# Patient Record
Sex: FEMALE | Race: WHITE | NOT HISPANIC OR LATINO | Employment: OTHER | URBAN - METROPOLITAN AREA
[De-identification: names, ages, dates, MRNs, and addresses within clinical notes are randomized per-mention and may not be internally consistent; named-entity substitution may affect disease eponyms.]

---

## 2021-01-01 ENCOUNTER — OFFICE VISIT (OUTPATIENT)
Dept: RADIATION ONCOLOGY | Facility: HOSPITAL | Age: 86
End: 2021-01-01

## 2021-01-01 ENCOUNTER — HOSPITAL ENCOUNTER (INPATIENT)
Facility: HOSPITAL | Age: 86
LOS: 1 days | End: 2021-04-26
Attending: INTERNAL MEDICINE | Admitting: INTERNAL MEDICINE

## 2021-01-01 ENCOUNTER — HOSPITAL ENCOUNTER (OUTPATIENT)
Dept: RADIATION ONCOLOGY | Facility: HOSPITAL | Age: 86
Setting detail: RADIATION/ONCOLOGY SERIES
Discharge: HOME | End: 2021-04-21

## 2021-01-01 ENCOUNTER — HOSPITAL ENCOUNTER (INPATIENT)
Facility: HOSPITAL | Age: 86
LOS: 1 days | Discharge: HOSPICE | End: 2021-04-25
Attending: RADIOLOGY | Admitting: INTERNAL MEDICINE

## 2021-01-01 VITALS
DIASTOLIC BLOOD PRESSURE: 51 MMHG | SYSTOLIC BLOOD PRESSURE: 90 MMHG | RESPIRATION RATE: 20 BRPM | HEART RATE: 135 BPM | OXYGEN SATURATION: 87 % | TEMPERATURE: 101.9 F

## 2021-01-01 VITALS
BODY MASS INDEX: 29.62 KG/M2 | WEIGHT: 156.75 LBS | TEMPERATURE: 98.2 F | OXYGEN SATURATION: 92 % | SYSTOLIC BLOOD PRESSURE: 111 MMHG | DIASTOLIC BLOOD PRESSURE: 71 MMHG | RESPIRATION RATE: 22 BRPM | HEART RATE: 121 BPM

## 2021-01-01 VITALS
SYSTOLIC BLOOD PRESSURE: 141 MMHG | OXYGEN SATURATION: 96 % | RESPIRATION RATE: 16 BRPM | TEMPERATURE: 97.8 F | HEART RATE: 77 BPM | DIASTOLIC BLOOD PRESSURE: 80 MMHG | HEIGHT: 61 IN | BODY MASS INDEX: 28.89 KG/M2 | WEIGHT: 153 LBS

## 2021-01-01 DIAGNOSIS — C34.11 MALIGNANT NEOPLASM OF UPPER LOBE OF RIGHT LUNG (HCC): ICD-10-CM

## 2021-01-01 DIAGNOSIS — C79.31 BRAIN METASTASES: Primary | ICD-10-CM

## 2021-01-01 DIAGNOSIS — C32.9 LARYNGEAL CANCER (HCC): Primary | ICD-10-CM

## 2021-01-01 DIAGNOSIS — C50.412 MALIGNANT NEOPLASM OF UPPER-OUTER QUADRANT OF LEFT BREAST IN FEMALE, ESTROGEN RECEPTOR POSITIVE (HCC): ICD-10-CM

## 2021-01-01 DIAGNOSIS — I48.20 CHRONIC ATRIAL FIBRILLATION (HCC): ICD-10-CM

## 2021-01-01 DIAGNOSIS — Z17.0 MALIGNANT NEOPLASM OF UPPER-OUTER QUADRANT OF LEFT BREAST IN FEMALE, ESTROGEN RECEPTOR POSITIVE (HCC): ICD-10-CM

## 2021-01-01 LAB
CHOLEST SERPL-MCNC: 93 MG/DL (ref 0–200)
HBA1C MFR BLD: 4.7 % (ref 4.8–5.6)
HDLC SERPL-MCNC: 34 MG/DL (ref 40–60)
LDLC SERPL CALC-MCNC: 46 MG/DL (ref 0–100)
LDLC/HDLC SERPL: 1.42 {RATIO}
SARS-COV-2 RDRP RESP QL NAA+PROBE: DETECTED
TRIGL SERPL-MCNC: 54 MG/DL (ref 0–150)
VLDLC SERPL-MCNC: 13 MG/DL (ref 5–40)

## 2021-01-01 PROCEDURE — C1894 INTRO/SHEATH, NON-LASER: HCPCS | Performed by: RADIOLOGY

## 2021-01-01 PROCEDURE — 25010000002 FUROSEMIDE PER 20 MG: Performed by: NURSE PRACTITIONER

## 2021-01-01 PROCEDURE — 25010000002 HALOPERIDOL LACTATE PER 5 MG: Performed by: NURSE PRACTITIONER

## 2021-01-01 PROCEDURE — C1760 CLOSURE DEV, VASC: HCPCS | Performed by: RADIOLOGY

## 2021-01-01 PROCEDURE — 25010000002 MORPHINE PER 10 MG: Performed by: NURSE PRACTITIONER

## 2021-01-01 PROCEDURE — 25010000002 LORAZEPAM PER 2 MG: Performed by: NURSE PRACTITIONER

## 2021-01-01 PROCEDURE — B3181ZZ FLUOROSCOPY OF BILATERAL INTERNAL CAROTID ARTERIES USING LOW OSMOLAR CONTRAST: ICD-10-PCS | Performed by: RADIOLOGY

## 2021-01-01 PROCEDURE — 99223 1ST HOSP IP/OBS HIGH 75: CPT | Performed by: INTERNAL MEDICINE

## 2021-01-01 PROCEDURE — 03CG3ZZ EXTIRPATION OF MATTER FROM INTRACRANIAL ARTERY, PERCUTANEOUS APPROACH: ICD-10-PCS | Performed by: RADIOLOGY

## 2021-01-01 PROCEDURE — 80061 LIPID PANEL: CPT | Performed by: RADIOLOGY

## 2021-01-01 PROCEDURE — 99232 SBSQ HOSP IP/OBS MODERATE 35: CPT | Performed by: INTERNAL MEDICINE

## 2021-01-01 PROCEDURE — 61645 PERQ ART M-THROMBECT &/NFS: CPT | Performed by: RADIOLOGY

## 2021-01-01 PROCEDURE — C2628 CATHETER, OCCLUSION: HCPCS | Performed by: RADIOLOGY

## 2021-01-01 PROCEDURE — 99231 SBSQ HOSP IP/OBS SF/LOW 25: CPT | Performed by: RADIOLOGY

## 2021-01-01 PROCEDURE — 25010000002 KETOROLAC TROMETHAMINE PER 15 MG: Performed by: NURSE PRACTITIONER

## 2021-01-01 PROCEDURE — C1769 GUIDE WIRE: HCPCS | Performed by: RADIOLOGY

## 2021-01-01 PROCEDURE — 87635 SARS-COV-2 COVID-19 AMP PRB: CPT | Performed by: RADIOLOGY

## 2021-01-01 PROCEDURE — 25010000002 ONDANSETRON PER 1 MG: Performed by: INTERNAL MEDICINE

## 2021-01-01 PROCEDURE — G0463 HOSPITAL OUTPT CLINIC VISIT: HCPCS

## 2021-01-01 PROCEDURE — 0 IODIXANOL PER 1 ML: Performed by: RADIOLOGY

## 2021-01-01 PROCEDURE — 83036 HEMOGLOBIN GLYCOSYLATED A1C: CPT | Performed by: RADIOLOGY

## 2021-01-01 PROCEDURE — 25010000002 ONDANSETRON PER 1 MG: Performed by: STUDENT IN AN ORGANIZED HEALTH CARE EDUCATION/TRAINING PROGRAM

## 2021-01-01 RX ORDER — WARFARIN SODIUM 4 MG/1
TABLET ORAL
COMMUNITY
Start: 2021-01-01

## 2021-01-01 RX ORDER — ACETAMINOPHEN 160 MG
2000 TABLET,DISINTEGRATING ORAL DAILY
COMMUNITY
Start: 2021-01-01

## 2021-01-01 RX ORDER — CHLORTHALIDONE 25 MG/1
25 TABLET ORAL DAILY
COMMUNITY
Start: 2021-01-01

## 2021-01-01 RX ORDER — LORAZEPAM 2 MG/ML
1 INJECTION INTRAMUSCULAR EVERY 4 HOURS PRN
Status: DISCONTINUED | OUTPATIENT
Start: 2021-01-01 | End: 2021-01-01 | Stop reason: HOSPADM

## 2021-01-01 RX ORDER — SCOLOPAMINE TRANSDERMAL SYSTEM 1 MG/1
1 PATCH, EXTENDED RELEASE TRANSDERMAL
Status: DISCONTINUED | OUTPATIENT
Start: 2021-01-01 | End: 2021-04-27 | Stop reason: HOSPADM

## 2021-01-01 RX ORDER — FUROSEMIDE 10 MG/ML
20 INJECTION INTRAMUSCULAR; INTRAVENOUS EVERY 6 HOURS PRN
Status: DISCONTINUED | OUTPATIENT
Start: 2021-01-01 | End: 2021-04-27 | Stop reason: HOSPADM

## 2021-01-01 RX ORDER — NITROGLYCERIN 0.4 MG/1
TABLET SUBLINGUAL
COMMUNITY
Start: 2021-01-01

## 2021-01-01 RX ORDER — GLYCOPYRROLATE 0.2 MG/ML
0.2 INJECTION INTRAMUSCULAR; INTRAVENOUS EVERY 6 HOURS PRN
Status: DISCONTINUED | OUTPATIENT
Start: 2021-01-01 | End: 2021-04-27 | Stop reason: HOSPADM

## 2021-01-01 RX ORDER — BISACODYL 10 MG
10 SUPPOSITORY, RECTAL RECTAL DAILY PRN
Status: DISCONTINUED | OUTPATIENT
Start: 2021-01-01 | End: 2021-04-27 | Stop reason: HOSPADM

## 2021-01-01 RX ORDER — CANDESARTAN 32 MG/1
TABLET ORAL
COMMUNITY
Start: 2021-01-01

## 2021-01-01 RX ORDER — LORAZEPAM 2 MG/ML
1 INJECTION INTRAMUSCULAR EVERY 6 HOURS
Status: DISCONTINUED | OUTPATIENT
Start: 2021-01-01 | End: 2021-04-27 | Stop reason: HOSPADM

## 2021-01-01 RX ORDER — FERROUS SULFATE 325(65) MG
325 TABLET ORAL
COMMUNITY

## 2021-01-01 RX ORDER — LIDOCAINE HYDROCHLORIDE 10 MG/ML
INJECTION, SOLUTION EPIDURAL; INFILTRATION; INTRACAUDAL; PERINEURAL AS NEEDED
Status: DISCONTINUED | OUTPATIENT
Start: 2021-01-01 | End: 2021-01-01 | Stop reason: HOSPADM

## 2021-01-01 RX ORDER — SODIUM CHLORIDE 0.9 % (FLUSH) 0.9 %
10 SYRINGE (ML) INJECTION AS NEEDED
Status: DISCONTINUED | OUTPATIENT
Start: 2021-01-01 | End: 2021-01-01

## 2021-01-01 RX ORDER — PRAVASTATIN SODIUM 40 MG
40 TABLET ORAL
COMMUNITY
Start: 2021-01-01

## 2021-01-01 RX ORDER — ASPIRIN 325 MG
325 TABLET ORAL DAILY
Status: DISCONTINUED | OUTPATIENT
Start: 2021-01-01 | End: 2021-01-01

## 2021-01-01 RX ORDER — FUROSEMIDE 10 MG/ML
20 INJECTION INTRAMUSCULAR; INTRAVENOUS EVERY 6 HOURS
Status: DISCONTINUED | OUTPATIENT
Start: 2021-01-01 | End: 2021-04-27 | Stop reason: HOSPADM

## 2021-01-01 RX ORDER — ONDANSETRON 2 MG/ML
4 INJECTION INTRAMUSCULAR; INTRAVENOUS ONCE
Status: COMPLETED | OUTPATIENT
Start: 2021-01-01 | End: 2021-01-01

## 2021-01-01 RX ORDER — ANASTROZOLE 1 MG/1
1 TABLET ORAL DAILY
COMMUNITY
Start: 2021-01-01

## 2021-01-01 RX ORDER — GLYCOPYRROLATE 0.2 MG/ML
0.3 INJECTION INTRAMUSCULAR; INTRAVENOUS EVERY 4 HOURS PRN
Status: DISCONTINUED | OUTPATIENT
Start: 2021-01-01 | End: 2021-01-01 | Stop reason: HOSPADM

## 2021-01-01 RX ORDER — LABETALOL HYDROCHLORIDE 5 MG/ML
INJECTION, SOLUTION INTRAVENOUS AS NEEDED
Status: DISCONTINUED | OUTPATIENT
Start: 2021-01-01 | End: 2021-01-01 | Stop reason: HOSPADM

## 2021-01-01 RX ORDER — SODIUM CHLORIDE 9 MG/ML
75 INJECTION, SOLUTION INTRAVENOUS CONTINUOUS
Status: DISCONTINUED | OUTPATIENT
Start: 2021-01-01 | End: 2021-01-01

## 2021-01-01 RX ORDER — SODIUM CHLORIDE 0.9 % (FLUSH) 0.9 %
10 SYRINGE (ML) INJECTION EVERY 12 HOURS SCHEDULED
Status: DISCONTINUED | OUTPATIENT
Start: 2021-01-01 | End: 2021-01-01

## 2021-01-01 RX ORDER — LORAZEPAM 2 MG/ML
1 INJECTION INTRAMUSCULAR EVERY 4 HOURS PRN
Status: CANCELLED | OUTPATIENT
Start: 2021-01-01 | End: 2021-05-02

## 2021-01-01 RX ORDER — MORPHINE SULFATE 4 MG/ML
4 INJECTION, SOLUTION INTRAMUSCULAR; INTRAVENOUS EVERY 6 HOURS
Status: DISCONTINUED | OUTPATIENT
Start: 2021-01-01 | End: 2021-04-27 | Stop reason: HOSPADM

## 2021-01-01 RX ORDER — ONDANSETRON 2 MG/ML
4 INJECTION INTRAMUSCULAR; INTRAVENOUS EVERY 6 HOURS PRN
Status: DISCONTINUED | OUTPATIENT
Start: 2021-01-01 | End: 2021-01-01 | Stop reason: HOSPADM

## 2021-01-01 RX ORDER — GLYCOPYRROLATE 0.2 MG/ML
0.3 INJECTION INTRAMUSCULAR; INTRAVENOUS EVERY 4 HOURS PRN
Status: CANCELLED | OUTPATIENT
Start: 2021-01-01

## 2021-01-01 RX ORDER — MORPHINE SULFATE 2 MG/ML
2 INJECTION, SOLUTION INTRAMUSCULAR; INTRAVENOUS
Status: CANCELLED | OUTPATIENT
Start: 2021-01-01 | End: 2021-05-02

## 2021-01-01 RX ORDER — POTASSIUM CHLORIDE 750 MG/1
20 CAPSULE, EXTENDED RELEASE ORAL DAILY
COMMUNITY
Start: 2021-01-01

## 2021-01-01 RX ORDER — HALOPERIDOL 5 MG/ML
1 INJECTION INTRAMUSCULAR EVERY 4 HOURS PRN
Status: DISCONTINUED | OUTPATIENT
Start: 2021-01-01 | End: 2021-04-27 | Stop reason: HOSPADM

## 2021-01-01 RX ORDER — POLYVINYL ALCOHOL 14 MG/ML
2 SOLUTION/ DROPS OPHTHALMIC 2 TIMES DAILY
Status: DISCONTINUED | OUTPATIENT
Start: 2021-01-01 | End: 2021-04-27 | Stop reason: HOSPADM

## 2021-01-01 RX ORDER — GLYCOPYRROLATE 0.2 MG/ML
0.1 INJECTION INTRAMUSCULAR; INTRAVENOUS ONCE
Status: DISCONTINUED | OUTPATIENT
Start: 2021-01-01 | End: 2021-01-01

## 2021-01-01 RX ORDER — HYDROCODONE BITARTRATE AND ACETAMINOPHEN 5; 325 MG/1; MG/1
1 TABLET ORAL EVERY 6 HOURS PRN
COMMUNITY
Start: 2021-01-01

## 2021-01-01 RX ORDER — LORAZEPAM 2 MG/ML
0.5 INJECTION INTRAMUSCULAR EVERY 4 HOURS PRN
Status: DISCONTINUED | OUTPATIENT
Start: 2021-01-01 | End: 2021-01-01

## 2021-01-01 RX ORDER — SODIUM CHLORIDE 0.9 % (FLUSH) 0.9 %
3 SYRINGE (ML) INJECTION EVERY 12 HOURS SCHEDULED
Status: DISCONTINUED | OUTPATIENT
Start: 2021-01-01 | End: 2021-01-01

## 2021-01-01 RX ORDER — GABAPENTIN 300 MG/1
300 CAPSULE ORAL 3 TIMES DAILY
COMMUNITY
Start: 2021-01-01

## 2021-01-01 RX ORDER — ONDANSETRON 2 MG/ML
4 INJECTION INTRAMUSCULAR; INTRAVENOUS EVERY 6 HOURS PRN
Status: DISCONTINUED | OUTPATIENT
Start: 2021-01-01 | End: 2021-04-27 | Stop reason: HOSPADM

## 2021-01-01 RX ORDER — ONDANSETRON 4 MG/1
4 TABLET, ORALLY DISINTEGRATING ORAL EVERY 6 HOURS PRN
COMMUNITY
Start: 2021-01-01

## 2021-01-01 RX ORDER — BISOPROLOL FUMARATE 5 MG/1
2.5 TABLET, FILM COATED ORAL DAILY
COMMUNITY
Start: 2021-01-01

## 2021-01-01 RX ORDER — MORPHINE SULFATE 2 MG/ML
2 INJECTION, SOLUTION INTRAMUSCULAR; INTRAVENOUS
Status: DISCONTINUED | OUTPATIENT
Start: 2021-01-01 | End: 2021-01-01 | Stop reason: HOSPADM

## 2021-01-01 RX ORDER — LORAZEPAM 2 MG/ML
1 INJECTION INTRAMUSCULAR EVERY 4 HOURS PRN
Status: DISCONTINUED | OUTPATIENT
Start: 2021-01-01 | End: 2021-04-27 | Stop reason: HOSPADM

## 2021-01-01 RX ORDER — POLYVINYL ALCOHOL 14 MG/ML
2 SOLUTION/ DROPS OPHTHALMIC
Status: DISCONTINUED | OUTPATIENT
Start: 2021-01-01 | End: 2021-04-27 | Stop reason: HOSPADM

## 2021-01-01 RX ORDER — IODIXANOL 320 MG/ML
INJECTION, SOLUTION INTRAVASCULAR AS NEEDED
Status: DISCONTINUED | OUTPATIENT
Start: 2021-01-01 | End: 2021-01-01 | Stop reason: HOSPADM

## 2021-01-01 RX ORDER — ACETAMINOPHEN 650 MG/1
650 SUPPOSITORY RECTAL EVERY 4 HOURS PRN
Status: DISCONTINUED | OUTPATIENT
Start: 2021-01-01 | End: 2021-04-27 | Stop reason: HOSPADM

## 2021-01-01 RX ORDER — KETOROLAC TROMETHAMINE 15 MG/ML
15 INJECTION, SOLUTION INTRAMUSCULAR; INTRAVENOUS EVERY 6 HOURS PRN
Status: DISCONTINUED | OUTPATIENT
Start: 2021-01-01 | End: 2021-04-27 | Stop reason: HOSPADM

## 2021-01-01 RX ORDER — ASPIRIN 300 MG/1
300 SUPPOSITORY RECTAL DAILY
Status: DISCONTINUED | OUTPATIENT
Start: 2021-01-01 | End: 2021-01-01

## 2021-01-01 RX ORDER — ATORVASTATIN CALCIUM 40 MG/1
80 TABLET, FILM COATED ORAL NIGHTLY
Status: DISCONTINUED | OUTPATIENT
Start: 2021-01-01 | End: 2021-01-01

## 2021-01-01 RX ORDER — OMEPRAZOLE 20 MG/1
20 CAPSULE, DELAYED RELEASE ORAL DAILY
COMMUNITY
Start: 2021-01-01

## 2021-01-01 RX ORDER — ONDANSETRON 2 MG/ML
4 INJECTION INTRAMUSCULAR; INTRAVENOUS EVERY 6 HOURS PRN
Status: CANCELLED | OUTPATIENT
Start: 2021-01-01

## 2021-01-01 RX ADMIN — ONDANSETRON 4 MG: 2 INJECTION INTRAMUSCULAR; INTRAVENOUS at 14:00

## 2021-01-01 RX ADMIN — FUROSEMIDE 20 MG: 10 INJECTION, SOLUTION INTRAMUSCULAR; INTRAVENOUS at 17:52

## 2021-01-01 RX ADMIN — LORAZEPAM 0.5 MG: 2 INJECTION INTRAMUSCULAR; INTRAVENOUS at 17:41

## 2021-01-01 RX ADMIN — LORAZEPAM 1 MG: 2 INJECTION INTRAMUSCULAR; INTRAVENOUS at 15:03

## 2021-01-01 RX ADMIN — FUROSEMIDE 20 MG: 10 INJECTION, SOLUTION INTRAMUSCULAR; INTRAVENOUS at 11:57

## 2021-01-01 RX ADMIN — LORAZEPAM 1 MG: 2 INJECTION INTRAMUSCULAR; INTRAVENOUS at 08:48

## 2021-01-01 RX ADMIN — FUROSEMIDE 20 MG: 10 INJECTION, SOLUTION INTRAMUSCULAR; INTRAVENOUS at 05:11

## 2021-01-01 RX ADMIN — MORPHINE SULFATE 2 MG: 2 INJECTION, SOLUTION INTRAMUSCULAR; INTRAVENOUS at 13:51

## 2021-01-01 RX ADMIN — FUROSEMIDE 20 MG: 10 INJECTION, SOLUTION INTRAMUSCULAR; INTRAVENOUS at 00:25

## 2021-01-01 RX ADMIN — MORPHINE SULFATE 4 MG: 4 INJECTION, SOLUTION INTRAMUSCULAR; INTRAVENOUS at 08:48

## 2021-01-01 RX ADMIN — GLYCOPYRROLATE 0.1 MG: 0.2 INJECTION INTRAMUSCULAR; INTRAVENOUS at 12:33

## 2021-01-01 RX ADMIN — HALOPERIDOL LACTATE 1 MG: 5 INJECTION, SOLUTION INTRAMUSCULAR at 15:59

## 2021-01-01 RX ADMIN — SCOPALAMINE 1 PATCH: 1 PATCH, EXTENDED RELEASE TRANSDERMAL at 15:55

## 2021-01-01 RX ADMIN — MORPHINE SULFATE 4 MG: 4 INJECTION, SOLUTION INTRAMUSCULAR; INTRAVENOUS at 15:03

## 2021-01-01 RX ADMIN — ONDANSETRON 4 MG: 2 INJECTION INTRAMUSCULAR; INTRAVENOUS at 06:23

## 2021-01-01 RX ADMIN — SODIUM CHLORIDE 75 ML/HR: 9 INJECTION, SOLUTION INTRAVENOUS at 02:41

## 2021-01-01 RX ADMIN — LORAZEPAM 1 MG: 2 INJECTION INTRAMUSCULAR; INTRAVENOUS at 20:36

## 2021-01-01 RX ADMIN — MORPHINE SULFATE 4 MG: 4 INJECTION, SOLUTION INTRAMUSCULAR; INTRAVENOUS at 01:39

## 2021-01-01 RX ADMIN — LORAZEPAM 1 MG: 2 INJECTION INTRAMUSCULAR; INTRAVENOUS at 14:18

## 2021-01-01 RX ADMIN — MINERAL OIL: 1000 LIQUID ORAL at 17:52

## 2021-01-01 RX ADMIN — MORPHINE SULFATE 4 MG: 4 INJECTION, SOLUTION INTRAMUSCULAR; INTRAVENOUS at 19:55

## 2021-01-01 RX ADMIN — FUROSEMIDE 20 MG: 10 INJECTION, SOLUTION INTRAMUSCULAR; INTRAVENOUS at 15:58

## 2021-01-01 RX ADMIN — LORAZEPAM 1 MG: 2 INJECTION INTRAMUSCULAR; INTRAVENOUS at 01:39

## 2021-01-01 RX ADMIN — KETOROLAC TROMETHAMINE 15 MG: 15 INJECTION, SOLUTION INTRAMUSCULAR; INTRAVENOUS at 16:03

## 2021-04-21 PROBLEM — C50.919 BREAST CANCER (HCC): Status: ACTIVE | Noted: 2021-01-01

## 2021-04-21 PROBLEM — C34.90 LUNG CANCER (HCC): Status: ACTIVE | Noted: 2021-01-01

## 2021-04-21 PROBLEM — C79.31 BRAIN METASTASES: Status: ACTIVE | Noted: 2021-01-01

## 2021-04-21 PROBLEM — I48.91 ATRIAL FIBRILLATION (HCC): Status: ACTIVE | Noted: 2021-01-01

## 2021-04-21 NOTE — PROGRESS NOTES
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 female  who initially palpated a breast mass 1 year ago but delayed further work-up due to Covid presented with enlarging breast mass in early February.  She had a mammogram ultrasound and biopsy performed that showed a grade 2 ductal adenocarcinoma of the left breast.  The patient had CT scan that showed a right suprahilar mass.  She was then sent for an FNA 3/23/2021 that confirmed non-small cell lung cancer.  The patient was subsequently hospitalized and had a large hematoma due to bleeding while on anticoagulation.  The patient then underwent a PET scan on 2021 that showed a 4.7 cm right suprahilar mass and a hypermetabolic left-sided breast mass.  The patient also had a hematoma in her right axilla from her biopsy.  Patient complete her staging with an MRI of the brain 2021 that showed 3 small enhancing lesions in the cerebellum consistent with metastasis measuring about 5 to 6 cm apiece.  The patient was seen by medical oncology at the [facility] and we started on anastrozole and pembrolizumab.  The patient has seen Dr. Barahona at [facility] for radiation oncology and will start treatments to her lung soon.  The patient saw Dr. Maharaj who recommended  CyberKnife SRS for her cerebellar lesions and the patient presents today.  The patient reports that she has no symptoms from her lung cancer.  The patient does have breast mass which is palpable.  This causes her minimal irritation at this time.  Patient denies any new headaches.  The patient is legally blind so she has no changes in her vision.  She denies any sudden facial weakness or extremity weakness.  She denies any fevers, chills, nausea, vomiting.  Her symptoms are mild at this point from her CNS disease.    BMI:  Body mass index is 28.91 kg/m².      Social History     Substance and Sexual Activity   Alcohol Use Never       Allergies   Allergen Reactions   • Ibandronic Acid Unknown - Low Severity   • Meclizine Unknown - Low Severity     Current Outpatient Medications:   •  anastrozole (ARIMIDEX) 1 MG tablet, Take 1 mg by mouth Daily., Disp: , Rfl:   •  bisoprolol (ZEBeta) 5 MG tablet, Take 2.5 mg by mouth Daily., Disp: , Rfl:   •  Calcium 600+D High Potency 600-400 MG-UNIT per tablet, Take 1 tablet by mouth Daily., Disp: , Rfl:   •  candesartan (ATACAND) 32 MG tablet, TAKE 1/2 TABLET BY MOUTH DAILY. NEED APPOINTMENT FOR REFILLS, Disp: , Rfl:   •  chlorthalidone (HYGROTON) 25 MG tablet, Take 25 mg by mouth Daily., Disp: , Rfl:   •  Cholecalciferol (Vitamin D3) 50 MCG (2000 UT) capsule, Take 2,000 Units by mouth Daily., Disp: , Rfl:   •  ferrous sulfate 325 (65 FE) MG tablet, Take 325 mg by mouth Daily With Breakfast., Disp: , Rfl:   •  omeprazole (priLOSEC) 20 MG capsule, Take 20 mg by mouth Daily., Disp: , Rfl:   •  potassium chloride (MICRO-K) 10 MEQ CR capsule, Take 20 mEq by mouth Daily., Disp: , Rfl:   •  pravastatin (PRAVACHOL) 40 MG tablet, Take 40 mg by mouth every night at bedtime., Disp: , Rfl:   •  warfarin (COUMADIN) 4 MG tablet, , Disp: , Rfl:   •  gabapentin (NEURONTIN) 300 MG capsule, Take 300 mg by mouth 3 (Three) Times a Day., Disp: , Rfl:   •  HYDROcodone-acetaminophen (NORCO) 5-325 MG per  "tablet, Take 1 tablet by mouth Every 6 (Six) Hours As Needed. for pain, Disp: , Rfl:   •  mupirocin (BACTROBAN) 2 % ointment, APPLY SMALL AMOUNT TOPICALLY TO THE AFFECTED AREA TWICE DAILY, Disp: , Rfl:   •  nitroglycerin (NITROSTAT) 0.4 MG SL tablet, , Disp: , Rfl:   •  ondansetron ODT (ZOFRAN-ODT) 4 MG disintegrating tablet, 4 mg Every 6 (Six) Hours As Needed. DISSOLVE ON TONGUE, Disp: , Rfl:     Social History     Tobacco Use   • Smoking status: Former Smoker     Quit date:      Years since quittin.3   • Smokeless tobacco: Never Used   Substance Use Topics   • Alcohol use: Never   • Drug use: Never         Past Medical History:   Diagnosis Date   • A-fib (CMS/HCC)    • Anemia    • Brain cancer (CMS/HCC)    • Breast cancer (CMS/HCC)    • Hypertension    • Lung cancer (CMS/HCC)    • TIA (transient ischemic attack)      Family history  Father with CHF  Mother with a stroke  Sisters with hypertension heart failure  Brother with kidney disease      Past Surgical History:   Procedure Laterality Date   • BREAST BIOPSY     • CAROTID ENDARTERECTOMY     • COLONOSCOPY  2018   • SANJU          Review of Systems   Eyes: Positive for eye problems.   Cardiovascular: Positive for palpitations.   Psychiatric/Behavioral: Positive for depression and sleep disturbance. The patient is nervous/anxious.     A full 14 point review of systems was performed and was negative except as noted in the HPI.       Objective   VITAL SIGNS:   Vitals:    21 0855   BP: 141/80   Pulse: 77   Resp: 16   Temp: 97.8 °F (36.6 °C)   SpO2: 96%  Comment: RA   Weight: 69.4 kg (153 lb)   Height: 154.9 cm (61\")   PainSc: 0-No pain        KPS       70%    Physical Exam  Vitals and nursing note reviewed.   Constitutional:       Appearance: She is well-developed.      Comments: In wheelchair, alert  Very pleasant   HENT:      Head: Normocephalic and atraumatic.   Eyes:      Conjunctiva/sclera: Conjunctivae normal.      Pupils: Pupils are equal, round, " and reactive to light.   Neck:      Comments: No obviously enlarged cervical or supraclavicular LAD.  Cardiovascular:      Comments: Patient well perfused. Non cyanotic. No prominent JVD. No pedal edema  Pulmonary:      Effort: Pulmonary effort is normal.      Breath sounds: Normal breath sounds. No stridor. No wheezing.   Abdominal:      General: There is no distension.      Palpations: Abdomen is soft.   Musculoskeletal:         General: Normal range of motion.      Cervical back: Normal range of motion and neck supple.      Comments: Patient moves all extremities spontaneously.    Skin:     General: Skin is warm and dry.      Comments: Breast exam deferred.   Neurological:      General: No focal deficit present.      Mental Status: She is alert and oriented to person, place, and time. Mental status is at baseline.      Cranial Nerves: No cranial nerve deficit.      Comments: Coordination intact.  Legally blind   Psychiatric:         Mood and Affect: Mood normal.         Behavior: Behavior normal.         Thought Content: Thought content normal.         Judgment: Judgment normal.              The following portions of the patient's history were reviewed and updated as appropriate: allergies, current medications, past family history, past medical history, past social history, past surgical history and problem list.  I have personally requested and reviewed the patient's images and radiology reports and pathology reports listed below:  The patient's PET scan from 4/13/2021 showing a right hilar mass measuring 4.7 cm which is hypermetabolic and a left breast mass was hypermetabolic  The patient's MRI from 4/13/2021 showing 3 small enhancing lesions in the cerebellum consistent with metastasis measuring between 5 and 6 mm each.  The slice thickness is 3 mm and is not sufficient for CyberKnife planning.  Patient's FNA of the right upper lobe of the lung from 2/23/2021 showing non-small cell lung cancer  Patient is FNA  of the left breast showing grade 2 adenocarcinoma ER/AZ positive ductal carcinoma    Assessment      IMPRESSION:     Mrs. Spencer is a very pleasant 89-year-old female with 2 primary malignancies including a T2 N0 M0 left breast mass and a right upper lobe T2 N0 M1 non-small cell lung cancer.  Patient presents today to discuss her brain metastasis presumed from the lung cancer.  She has 3 small lesions in the cerebellum on her most recent scan.  It is likely she may have additional lesions.  We would recommend the patient repeat an MRI scan with thin slices for planning and to rule out additional metastases.  Follow with the patient to return for CT simulation.  We discussed the risk and benefits of CyberKnife with her and her son-in-law today.  She is interested in pursuing CyberKnife at this time and effort to avoid the toxicity of whole brain radiotherapy.  She is aware that she may suffer additional recurrences in the brain distant from the treated sites.  We discussed the risk of radionecrosis and subsequent issues with that.  The patient has started anastrozole and will also start pembrolizumab in the near future.  There should be no issue to treat the brain mets with concurrent pembrolizumab and anastrozole.   Greater than 1 hour was spent preparing for and coordinating this visit. >50% of the time was spent in direct face to face conversation with the patient teaching, answering question, and providing explanations regarding the patient's case.  The decision to treat the patient with radiation is a complex one and carries the risk of long-term side effects and complications.  The patient's malignancy represents a complicated life threatening condition that requires complex multidisciplinary management for treatment and followup.    RECOMMENDATIONS:    cT2 N0 M0 left-sided breast cancer  -Grade 2 ductal ER/AZ positive  -Not a surgical candidate  -Continue anastrozole    cT2 N0 M1 right upper lobe lung  cancer  -Biopsy-proven non-small cell lung cancer  -Has brain mets  -Hematoma caused by biopsy  -Plans to undergo radiotherapy treatments with Dr. Barahona  -To start her radiation soon for 5 treatments per the patient    3 cerebellar brain metastasis  -Limited disease  -Likely from lung cancer  -Discussed with Dr. Maharaj  -Recommend CyberKnife  -We will need replanning scan with 1 mm thickness cuts to rule out additional small lesions and for planning purposes  -Return for CT simulation after MRI scan  -Recommend single fraction 18-20 Mercedes to each of these lesions   -Recommendations may be different depending on the patient's planning scan    A. Fib  -Presently holding anticoagulation after recent bleeding  -Continue to monitor for neurologic symptoms could be difficult to discern potential stroke from tumor progression   -Likely high risk for thrombosis    Anemia  -Secondary to bleed  -Post recent transplants  -Continue to monitor             Praveen Vo MD      Errors in dictation may reflect use of voice recognition software and not all errors in transcription may have been detected prior to signing.

## 2021-04-24 PROBLEM — I63.511 ACUTE ISCHEMIC RIGHT MCA STROKE (HCC): Status: ACTIVE | Noted: 2021-01-01

## 2021-04-25 PROBLEM — I63.511 ACUTE ISCHEMIC RIGHT MIDDLE CEREBRAL ARTERY (MCA) STROKE (HCC): Status: ACTIVE | Noted: 2021-01-01

## 2021-04-25 PROBLEM — I63.9 CVA (CEREBRAL VASCULAR ACCIDENT) (HCC): Status: ACTIVE | Noted: 2021-01-01

## 2021-04-25 NOTE — DISCHARGE PLACEMENT REQUEST
"Bhavna Mackey (89 y.o. Female)     Date of Birth Social Security Number Address Home Phone MRN    02/04/1932  [address]  [facility] [address] [phone] [id number]    [employer] Marital Status          None        Admission Date Admission Type Admitting Provider Attending Provider Department, Room/Bed    4/24/21 Urgent Jose Forrest MD McIntosh, Matthew M, MD [facility] 2B ICU, N238/1    Discharge Date Discharge Disposition Discharge Destination                       Attending Provider: Jose Forrest MD    Allergies: Ibandronic Acid, Meclizine    Isolation: Contact Air   Infection: COVID (confirmed) (04/25/21)   Code Status: No CPR    Ht: 154.9 cm (61\")   Wt: 71.1 kg (156 lb 12 oz)    Admission Cmt: None   Principal Problem: Acute ischemic right MCA stroke (CMS/[facility]) [I63.511]                 Active Insurance as of 4/24/2021     Primary Coverage     Payor Plan Insurance Group Employer/Plan Group    MEDICARE MEDICARE A & B      Payor Plan Address Payor Plan Phone Number Payor Plan Fax Number Effective Dates    [address] [phone]  2/1/1997 - None Entered    [facility] [id number]       Subscriber Name Subscriber Birth Date Member ID       BHAVNA MACKEY 2/4/1932 [id number]           Secondary Coverage     Payor Plan Insurance Group Employer/Plan Group    [place] [id number]     Payor Plan Address Payor Plan Phone Number Payor Plan Fax Number Effective Dates    [address]   11/4/1990 - None Entered    [facility] [id number]       Subscriber Name Subscriber Birth Date Member ID       BHAVNA MACKEY 2/4/1932 [id number]                 Emergency Contacts      (Rel.) Home Phone Work Phone Mobile Phone    KhloeTucker astorga (Daughter) -- -- [phone]    Eusebio Cervantes (Other) -- -- [phone]            Emergency Contact Information     Name Relation Home Work Mobile    Tucker Cervantes Daughter   [phone]    Eusebio Cervantes Other   " [phone]          Insurance Information                MEDICARE/MEDICARE A & B Phone: [phone]    Subscriber: Bhavna Spencer Subscriber#: [id number]    Group#:  Precert#:         [facility] Phone:     Subscriber: Bhavna Spencer Subscriber#: [id number]    Group#: [id number] Precert#:              History & Physical      Jose Forrest MD at 04/24/21 2322            Intensive Care Admission Note     Acute ischemic right MCA stroke (CMS/HCC)    History of Present Illness     This an 89-year-old patient who was transferred to our facility after having first been evaluated at [facility] for abrupt onset of left hemiplegia.  At that facility she underwent cerebral imaging which showed a large right MCA occlusion with perfusion defect.  She has a history of recently diagnosed left breast adenocarcinoma as well as a second primary of right hilar lung non-small cell carcinoma.  It has been discovered that she has at least 3 intracerebral metastases from one or both of these cancers.  She has been on chronic warfarin secondary to atrial fibrillation however this was stopped at the end of March secondary to some bleeding after her biopsies for her breast cancer.    She was transferred to our facility for possible intervention in the Cath Lab with thrombectomy.  She was taken to the Cath Lab and underwent angiography with salvage thrombectomy of the right internal carotid to the MCA with return of JEANNE-3 flow.    Problem List, Surgical History, Family, Social History, and ROS     Patient Active Problem List    Diagnosis    • *Acute ischemic right MCA stroke (CMS/HCC) [I63.511]    • Brain metastases (CMS/HCC) [C79.31]    • Lung cancer (CMS/HCC) [C34.90]    • Breast cancer (CMS/HCC) [C50.919]    • Atrial fibrillation (CMS/HCC) [I48.91]      Past Surgical History:   Procedure Laterality Date   • BREAST BIOPSY     • CAROTID ENDARTERECTOMY     • COLONOSCOPY  2018   • SANJU         Allergies    Allergen Reactions   • Ibandronic Acid Unknown - Low Severity   • Meclizine Unknown - Low Severity     No current facility-administered medications on file prior to encounter.     Current Outpatient Medications on File Prior to Encounter   Medication Sig   • anastrozole (ARIMIDEX) 1 MG tablet Take 1 mg by mouth Daily.   • bisoprolol (ZEBeta) 5 MG tablet Take 2.5 mg by mouth Daily.   • Calcium 600+D High Potency 600-400 MG-UNIT per tablet Take 1 tablet by mouth Daily.   • candesartan (ATACAND) 32 MG tablet TAKE 1/2 TABLET BY MOUTH DAILY. NEED APPOINTMENT FOR REFILLS   • chlorthalidone (HYGROTON) 25 MG tablet Take 25 mg by mouth Daily.   • Cholecalciferol (Vitamin D3) 50 MCG (2000 UT) capsule Take 2,000 Units by mouth Daily.   • ferrous sulfate 325 (65 FE) MG tablet Take 325 mg by mouth Daily With Breakfast.   • gabapentin (NEURONTIN) 300 MG capsule Take 300 mg by mouth 3 (Three) Times a Day.   • HYDROcodone-acetaminophen (NORCO) 5-325 MG per tablet Take 1 tablet by mouth Every 6 (Six) Hours As Needed. for pain   • mupirocin (BACTROBAN) 2 % ointment APPLY SMALL AMOUNT TOPICALLY TO THE AFFECTED AREA TWICE DAILY   • nitroglycerin (NITROSTAT) 0.4 MG SL tablet    • omeprazole (priLOSEC) 20 MG capsule Take 20 mg by mouth Daily.   • ondansetron ODT (ZOFRAN-ODT) 4 MG disintegrating tablet 4 mg Every 6 (Six) Hours As Needed. DISSOLVE ON TONGUE   • potassium chloride (MICRO-K) 10 MEQ CR capsule Take 20 mEq by mouth Daily.   • pravastatin (PRAVACHOL) 40 MG tablet Take 40 mg by mouth every night at bedtime.   • warfarin (COUMADIN) 4 MG tablet      MEDICATION LIST AND ALLERGIES REVIEWED.    No family history on file.  Social History     Tobacco Use   • Smoking status: Former Smoker     Quit date: 2000     Years since quittin.3   • Smokeless tobacco: Never Used   Substance Use Topics   • Alcohol use: Never   • Drug use: Never         Review of Systems   Unable to perform ROS: Mental status change       Physical Exam and  Clinical Information   BP (!) 201/129   Pulse (!) 130   Resp 18   SpO2 97%   Physical Exam  Constitutional:       Comments: Very ill-appearing woman.   HENT:      Head: Normocephalic.      Nose: No congestion.   Eyes:      Extraocular Movements: Extraocular movements intact.      Pupils: Pupils are equal, round, and reactive to light.   Cardiovascular:      Rate and Rhythm: Tachycardia present. Rhythm irregular.      Pulses: Normal pulses.   Pulmonary:      Effort: Pulmonary effort is normal.      Breath sounds: Normal breath sounds.   Abdominal:      General: Abdomen is flat. Bowel sounds are normal.   Musculoskeletal:      Cervical back: Neck supple. No rigidity.      Comments: Not moving left upper extremity.   Lymphadenopathy:      Cervical: No cervical adenopathy.   Skin:     General: Skin is dry.      Coloration: Skin is not jaundiced.      Findings: No bruising.   Neurological:      Comments: Somnolent.                Impression     Medical Problems     Hospital Problem List     * (Principal) Acute ischemic right MCA stroke (CMS/HCC)    Brain metastases (CMS/HCC)    Lung cancer (CMS/HCC)    Breast cancer (CMS/HCC)    Atrial fibrillation (CMS/HCC)              Plan/Recommendations     This patient has an extremely poor prognosis giving the large stroke she has experienced which is most certainly cardioembolic, with history of 2 primary cancers with signs of metastasis to the brain.    She will be admitted to the intensive care unit for postoperative care.    Discussions will need to be had regarding goals of care with her family if she is not able to make these determinations herself.  At this time, I feel it would be inhumane to subject her to resuscitation with CPR or defibrillation in the event of cardiac arrest and life support with mechanical ventilation likely would only prolong her suffering.  I feel that it would be most appropriate to make her a DO NOT RESUSCITATE with full support however as  before we will discuss this with her family.    Follow-up labs will be ordered.  Postoperative neurochecks will be undertaken.  I would like for palliative care and hospice to be consulted tomorrow to help work out some goals of care as well as to potentially establish a plan if she is able to leave the hospital.    This patient remains at very high risk of worsening for all the above reasons.    High level of risk due to:  severe exacerbation of chronic illness, illness with threat to life or bodily function, abrupt change in neurological status and decision for DNR or to de-escalate care.        Jose Forrest MD, [facility]  Pulmonary and Critical Care Medicine  04/24/21 23:22 EDT     CC: Clifford Lira MD    Electronically signed by Jose Forrest MD at 04/25/21 0035

## 2021-04-25 NOTE — SIGNIFICANT NOTE
The stroke navigator has discussed the patient's prognosis with the family and at this point they would like her listed as a DO NOT RESUSCITATE/DO NOT INTUBATE.  I will go ahead make orders to that effect.  We likely will proceed with comfort measures however we will continue on with blood pressure control and neurological checks for now.    Jose Forrest MD

## 2021-04-25 NOTE — PROGRESS NOTES
Critical Care Note     LOS: 1 day   Patient Care Team:  Clifford Lira MD as PCP - General (Family Medicine)  Praveen Vo MD as Consulting Physician (Radiation Oncology)  Jose Maharaj MD as Referring Physician (Neurosurgery)  Libby Reich as Consulting Physician  Jose Rosen MD as Consulting Physician (Internal Medicine)  Libby Reich    Chief Complaint/Reason for visit:    Large right middle cerebral artery stroke  Metastatic breast cancer  COVID-19 positive      Subjective     89-year-old woman transferred from [facility] with left hemiplegia and a large right middle cerebral artery stroke.  She was recently diagnosed with metastatic breast cancer as well as a second primary right hilar non-small cell lung cancer with intracerebral metastases.  She was on chronic Coumadin for atrial fibrillation but this was stopped secondary to bleeding from her breast biopsy.  She was taken to the Cath Lab and underwent angiography and salvage thrombectomy of the right internal carotid and middle cerebral arteries.  Unfortunately mentation did not improve and subsequently COVID-19 swab returned positive.    Interval History:     Given poor response to thrombectomy ongoing neurologic deficits, metastatic cancer and now COVID-19 positive, family has elected to shift to comfort measures.    Review of Systems:    All systems were reviewed and negative except as noted in subjective.    Medical history, surgical history, social history, family history reviewed    Objective     Intake/Output:    Intake/Output Summary (Last 24 hours) at 4/25/2021 1217  Last data filed at 4/25/2021 0800  Gross per 24 hour   Intake 441.1 ml   Output 400 ml   Net 41.1 ml       Nutrition:  NPO Diet    Infusions:         Telemetry: Atrial fibrillation             Vital Signs  Blood pressure 122/61, pulse 94, temperature 98.2 °F (36.8 °C), temperature source Oral, resp. rate 18, weight 71.1 kg (156 lb 12 oz), SpO2 94  %.    Physical Exam:  General Appearance:   Ill-appearing elderly woman supine in bed   Head:   Atraumatic   Eyes:             Ears:     Throat:  Mucosa dry   Neck:  Trachea midline   Back:      Lungs:    Equal breath sounds, clear    Heart:   Irregular rhythm, increased rate, S1, S2 auscultated   Abdomen:    Bowel sounds present, soft   Rectal:   Deferred   Extremities:  No pretibial edema   Pulses:    Skin:  Warm and dry   Lymph nodes:    Neurologic:  Somnolent, flaccid left side, arouses with stimuli, gaze forced to the right      Results Review:     I reviewed the patient's new clinical results.         Invalid input(s): LABALBU, PROT        Invalid input(s): NEUTOPHILPCT,  EOSPCT      No results found for: BLOODCX  No results found for: URINECX    I reviewed the patient's new imaging including images and reports.      All medications reviewed.   glycopyrrolate, 0.1 mg, Intravenous, Once          Assessment/Plan       Acute ischemic right MCA stroke (CMS/HCC)    Brain metastases (CMS/HCC)    Lung cancer (CMS/HCC)    Breast cancer (CMS/HCC)    Atrial fibrillation (CMS/HCC)    Acute ischemic right middle cerebral artery (MCA) stroke (CMS/HCC)      Unfortunate 89-year-old woman who suffered a large stroke and has recently been diagnosed with both potential lung cancer and breast cancer with brain metastases.  She underwent a salvage thrombectomy without improvement and is now shifted to comfort measures.  In addition her COVID-19 swab was positive.  Repeat swab is pending.    PLAN:  Comfort measures including morphine for dyspnea or pain, Ativan for agitation and Robinul for secretions        Elaine Parmar MD  04/25/21  12:17 EDT      Time: 20 minutes.

## 2021-04-25 NOTE — PLAN OF CARE
Neurointerventional Metrics    Last Known Well:  1700    [facility] Arrival (ED/transfer):  2240    Code Stroke Initiated (Inpatient):  n/a    Initial NIHSS: 19    Baseline MRS:  1    IV tPA:  No, >4.5 hrs since LKW    CT Head: @[facility], direct transfer to cath lab    CT Perfusion: @[facility], direct transfer to cath lab    Arrival to Cath Lab:  2244    Groin Access: 2257    Initial Thrombectomy/Intervention (stent retriever deployment/aspiration/IA tPA):  2308    Reperfusion:  2311    Final Angiogram:  2313    End of Procedure:  2315    Pre-Procedure TICI flow:  0    Post-Procedure TICI flow:  3    Emboli to New Vascular Territory:  No

## 2021-04-25 NOTE — BRIEF OP NOTE
"CAROTID CEREBRAL ANGIOGRAM BILATERAL  Progress Note    Bhavna Spencer  4/24/2021 - 4/25/2021    Pre-op Diagnosis:   Acute CVA, right MCA thromboembolic occlusion       Post-Op Diagnosis Codes:  Acute CVA, intracranial right ICA thromboembolic occlusion    Procedure/CPT® Codes:        Procedure(s):  Mechanical thrombectomy, right middle cerebral artery    Surgeon(s):  Ramón Almaraz MD    Anesthesia: Local    Staff:   Documenter: Tala Bower  Invasive Nurse: Audrey Poole RN  Invasive Technologist: Jonathan Jo         Estimated Blood Loss: minimal    Urine Voided: * No values recorded between 4/24/2021 10:44 PM and 4/24/2021 11:23 PM *    Specimens:                None          Drains: * No LDAs found *    Findings: There is abrupt occlusion intracranial right internal carotid artery, extending into the right middle cerebral artery (carotid \"T\" occlusion), with TICI 0 flow.  This responded well to mechanical thrombectomy (Trevo) with successful extraction of a large thrombus and restoration of normal (TICI 3) flow to the right cerebral hemisphere.  There is an unremarkable appearance of the carotid bifurcation, and the findings are consistent with a cardiac thromboembolic source in this patient with known history of atrial fibrillation.    Complications: None apparent.          Ramón Almaraz MD     Date: 4/24/2021  Time: 23:23 EDT      "

## 2021-04-25 NOTE — PLAN OF CARE
Goal Outcome Evaluation:      Pt remains comfort measures and was admitted to hospice care this afternoon. She has transfer orders Discussed with both daughters and son. Morphine, toradol given for back and hip pain. Haldol and zofran given for nausea. Ativan given PRN for anxiety. Pt is resting comfortably. Robinul and lasix given for secretions. UOP adequate. Daughter is at bedside and discussed visiting policy with her. Paged hospice nurse to let her know that the patients heart rate has been slowly increasing she is now in the 130-170's with Afib (chronic history not new onset); told to just watch it for now. Spoke with oncall hospice RN.

## 2021-04-25 NOTE — NURSING NOTE
ACC REVIEW REPORT: [facility]        PATIENT NAME: Bhavna Spencer    PATIENT ID: [id number]        COVID-19 ACC SCREENING       DOES THE PATIENT HAVE A FEVER GREATER THAN OR EQUAL .4: 98.7    IS THE PATIENT EXPERIENCING SHORTNESS OF BREATH: NO    DOES THE PATIENT HAVE A COUGH: NO  DOES THE PATIENT HAVE ANY OF THE FOLLOWING RISK FACTORS:    EXPOSURE TO SUSPECTED OR KNOWN COVID-19: NO    RECENT TRAVEL HISTORY TO ENDEMIC AREA (DOMESTIC/LOCAL): NO    IS THE PATIENT A HEALTHCARE WORKER: NO    HAS THE PATIENT EXPERIENCED A LOSS OF SENSE OF TASTE OR SMELL: NO    HAS THE PATIENT BEEN TESTED FOR COVID-19: TESTED 2 WEEKS AGO WHEN HAD BIOPSY AND WAS NEGATIVE PER REPORT    DATE TESTED:2 WEEKS AGO    LAB TESTING SENT TO:           BED: COMING TO THE ER AND WILL BE SEEN BY THE STROKE NAVIGATOR UNTIL CATH LAB ARRIVES    BED TYPE:     BED GIVEN TO: CRESENCIO GARNER RN    TIME BED GIVEN: 2223    TODAY'S DATE: 4/24/2021    TRANSFER DATE: 4/24/21    ETA: WILL CALL    TRANSFERRING FACILITY: [facility]    TRANSFERRING FACILITY PHONE # : [phone]    TRANSFERRING MD:     DATE/TIME REQUEST RECEIVED: 4/24/21 @ 2210    Located [facility] RN: RADHA MARKS RN    REPORT FROM: RAMON GARNER RN    TIME REPORT TAKEN: 2223    DIAGNOSIS: CVA    REASON FOR TRANSFER TO [facility] within [facility]: HIGHER LEVEL OF CARE    TRANSPORTATION: LOCAL EMS    CLINICAL REASON FOR TRANSFER TO Located within [facility]: PRESENTED FROM HOME , LAST KNOW NORMAL 5PM, LEFT SIDED PARALYSIS FLACCID, SLURRED SPEECH, ALERT AND CAN ANSWER QUESTIONS HAVING TROUBLE GETTING WORDS OUT    NIH 24  CT SCAN RIGHT SIDED CEREBRAL CVA    AFIB -144  / 125  96% 2 LITERS  WAS 88 % ON ROOM AIR UPON ARRIVAL    WAS VOMITING UPON ARRIVAL          CLINICAL INFORMATION      VITAL SIGNS:   7TEMP: 98.7  PULSE: 120-144  B/P: 208/125  RESP:   88% ON ROOM AIR  96% ON 2 LITERS        LAB INFORMATION: GLUCOSE 152    CULTURE INFORMATION:     MEDS/IV FLUIDS: # 18 LAC # 18 RAC    ZOFRAN GIVEN BY EMS      CARDIAC  SYSTEM:    CHEST PAIN: NONE REPORTED    RHYTHM: AFIB RVR    Is patient taking or has patient been given any drugs that could increase bleeding? COUMADIN BUT HAS BEEN ON HOLD SINCE BIOPSY 2 WEEKS AGO PER REPORT    D  RESPIRATORY SYSTEM:    O2 SAT: 96% ON 2 LITERS        CNS/MUSCULOSKELETAL    ALERT AND ORIENTED: YES    STROKE SCALE: 24    ASPHASIA: YES     LEFT SIDED FLACCID PARALYSIS YES          EXTREMITY WEAKNESS:    LEFT ARM: FLACCID  LEFT LEG: FLACCID    CAT SCAN RESULTS: RIGHT SIDED CEREBRAL CVA      GI//GY      ABDOMINAL PAIN: NONE REPORTED    VOMITING: YES      PAST MEDICAL HISTORY: AFIB, BREAST /LUNG CANCER WITH METS TO BRAIN, BIOPSY 2 WEEKS AGO, BLIND          Carolyne Kilgore, RN  4/24/2021  22:28 EDT

## 2021-04-25 NOTE — H&P
Intensive Care Admission Note     Acute ischemic right MCA stroke (CMS/HCC)    History of Present Illness     This an 89-year-old patient who was transferred to our facility after having first been evaluated at [facility] for abrupt onset of left hemiplegia.  At that facility she underwent cerebral imaging which showed a large right MCA occlusion with perfusion defect.  She has a history of recently diagnosed left breast adenocarcinoma as well as a second primary of right hilar lung non-small cell carcinoma.  It has been discovered that she has at least 3 intracerebral metastases from one or both of these cancers.  She has been on chronic warfarin secondary to atrial fibrillation however this was stopped at the end of March secondary to some bleeding after her biopsies for her breast cancer.    She was transferred to our facility for possible intervention in the Cath Lab with thrombectomy.  She was taken to the Cath Lab and underwent angiography with salvage thrombectomy of the right internal carotid to the MCA with return of JEANNE-3 flow.    Problem List, Surgical History, Family, Social History, and ROS     Patient Active Problem List    Diagnosis    • *Acute ischemic right MCA stroke (CMS/HCC) [I63.511]    • Brain metastases (CMS/HCC) [C79.31]    • Lung cancer (CMS/HCC) [C34.90]    • Breast cancer (CMS/HCC) [C50.919]    • Atrial fibrillation (CMS/HCC) [I48.91]      Past Surgical History:   Procedure Laterality Date   • BREAST BIOPSY     • CAROTID ENDARTERECTOMY     • COLONOSCOPY  2018   • SANJU         Allergies   Allergen Reactions   • Ibandronic Acid Unknown - Low Severity   • Meclizine Unknown - Low Severity     No current facility-administered medications on file prior to encounter.     Current Outpatient Medications on File Prior to Encounter   Medication Sig   • anastrozole (ARIMIDEX) 1 MG tablet Take 1 mg by mouth Daily.   • bisoprolol (ZEBeta) 5 MG tablet Take 2.5 mg by mouth Daily.   • Calcium  600+D High Potency 600-400 MG-UNIT per tablet Take 1 tablet by mouth Daily.   • candesartan (ATACAND) 32 MG tablet TAKE 1/2 TABLET BY MOUTH DAILY. NEED APPOINTMENT FOR REFILLS   • chlorthalidone (HYGROTON) 25 MG tablet Take 25 mg by mouth Daily.   • Cholecalciferol (Vitamin D3) 50 MCG (2000 UT) capsule Take 2,000 Units by mouth Daily.   • ferrous sulfate 325 (65 FE) MG tablet Take 325 mg by mouth Daily With Breakfast.   • gabapentin (NEURONTIN) 300 MG capsule Take 300 mg by mouth 3 (Three) Times a Day.   • HYDROcodone-acetaminophen (NORCO) 5-325 MG per tablet Take 1 tablet by mouth Every 6 (Six) Hours As Needed. for pain   • mupirocin (BACTROBAN) 2 % ointment APPLY SMALL AMOUNT TOPICALLY TO THE AFFECTED AREA TWICE DAILY   • nitroglycerin (NITROSTAT) 0.4 MG SL tablet    • omeprazole (priLOSEC) 20 MG capsule Take 20 mg by mouth Daily.   • ondansetron ODT (ZOFRAN-ODT) 4 MG disintegrating tablet 4 mg Every 6 (Six) Hours As Needed. DISSOLVE ON TONGUE   • potassium chloride (MICRO-K) 10 MEQ CR capsule Take 20 mEq by mouth Daily.   • pravastatin (PRAVACHOL) 40 MG tablet Take 40 mg by mouth every night at bedtime.   • warfarin (COUMADIN) 4 MG tablet      MEDICATION LIST AND ALLERGIES REVIEWED.    No family history on file.  Social History     Tobacco Use   • Smoking status: Former Smoker     Quit date:      Years since quittin.3   • Smokeless tobacco: Never Used   Substance Use Topics   • Alcohol use: Never   • Drug use: Never         Review of Systems   Unable to perform ROS: Mental status change       Physical Exam and Clinical Information   BP (!) 201/129   Pulse (!) 130   Resp 18   SpO2 97%   Physical Exam  Constitutional:       Comments: Very ill-appearing woman.   HENT:      Head: Normocephalic.      Nose: No congestion.   Eyes:      Extraocular Movements: Extraocular movements intact.      Pupils: Pupils are equal, round, and reactive to light.   Cardiovascular:      Rate and Rhythm: Tachycardia present.  Rhythm irregular.      Pulses: Normal pulses.   Pulmonary:      Effort: Pulmonary effort is normal.      Breath sounds: Normal breath sounds.   Abdominal:      General: Abdomen is flat. Bowel sounds are normal.   Musculoskeletal:      Cervical back: Neck supple. No rigidity.      Comments: Not moving left upper extremity.   Lymphadenopathy:      Cervical: No cervical adenopathy.   Skin:     General: Skin is dry.      Coloration: Skin is not jaundiced.      Findings: No bruising.   Neurological:      Comments: Somnolent.                Impression     Medical Problems     Hospital Problem List     * (Principal) Acute ischemic right MCA stroke (CMS/HCC)    Brain metastases (CMS/HCC)    Lung cancer (CMS/HCC)    Breast cancer (CMS/HCC)    Atrial fibrillation (CMS/HCC)              Plan/Recommendations     This patient has an extremely poor prognosis giving the large stroke she has experienced which is most certainly cardioembolic, with history of 2 primary cancers with signs of metastasis to the brain.    She will be admitted to the intensive care unit for postoperative care.    Discussions will need to be had regarding goals of care with her family if she is not able to make these determinations herself.  At this time, I feel it would be inhumane to subject her to resuscitation with CPR or defibrillation in the event of cardiac arrest and life support with mechanical ventilation likely would only prolong her suffering.  I feel that it would be most appropriate to make her a DO NOT RESUSCITATE with full support however as before we will discuss this with her family.    Follow-up labs will be ordered.  Postoperative neurochecks will be undertaken.  I would like for palliative care and hospice to be consulted tomorrow to help work out some goals of care as well as to potentially establish a plan if she is able to leave the hospital.    This patient remains at very high risk of worsening for all the above reasons.    High  level of risk due to:  severe exacerbation of chronic illness, illness with threat to life or bodily function, abrupt change in neurological status and decision for DNR or to de-escalate care.        Jose Forrest MD, [facility]  Pulmonary and Critical Care Medicine  04/24/21 23:22 EDT     CC: Clifford Lira MD

## 2021-04-25 NOTE — PLAN OF CARE
Goal Outcome Evaluation:  Plan of Care Reviewed With: patient  Progress: no change  Outcome Summary: Pt newly admitted this shift. NIH 21 upon arrival from cath lab. Pt arouses to voice and orientedx4. Slurred speech and completely flaccid on left side. Left facial droop present and pt has left side neglect. Pt made comfort measures. VSS.

## 2021-04-25 NOTE — DISCHARGE SUMMARY
Transfer Summary    Patient name: Bhavna Spencer  CSN: [id number]  MRN: [id number]  : 1932  Today's date: 2021     Date of Admission: 2021    Date of Discharge:  2021    Admitting Physician: ION Forrest MD    Attending Physician: CHRISTOPHER Parmar MD    Primary Care Provider: Clifford Lira MD    Consultations:   Hospice    Admission Diagnosis:   Acute ischemic right MCA stroke  Brain metastasis  Lung cancer  Breast cancer  Atrial fibrillation    Discharge Diagnoses:     Acute ischemic right MCA stroke (CMS/HCC)    Brain metastases (CMS/HCC)    Lung cancer (CMS/HCC)    Breast cancer (CMS/HCC)    Atrial fibrillation (CMS/HCC)    Acute ischemic right middle cerebral artery (MCA) stroke (CMS/HCC)      Procedures:  Procedure(s):  CAROTID CEREBRAL ANGIOGRAM BILATERAL       History of Present Illness:  This an 89-year-old patient who was transferred to our facility after having first been evaluated at [facility] for abrupt onset of left hemiplegia.  At that facility she underwent cerebral imaging which showed a large right MCA occlusion with perfusion defect.  She has a history of recently diagnosed left breast adenocarcinoma as well as a second primary of right hilar lung non-small cell carcinoma.  It has been discovered that she has at least 3 intracerebral metastases from one or both of these cancers.  She has been on chronic warfarin secondary to atrial fibrillation however this was stopped at the end of March secondary to some bleeding after her biopsies for her breast cancer.     She was transferred to our facility for possible intervention in the Cath Lab with thrombectomy.  She was taken to the Cath Lab and underwent angiography with salvage thrombectomy of the right internal carotid to the MCA with return of JEANNE-3 flow.    Hospital Course:  Bhavna Spencer is a 89 y.o. female who presented to [facility] as discussed in HPI.  She was admitted to ICU post  procedure.  Her overall prognosis was discussed with patient's family who wished to make her DO NOT RESUSCITATE/DO NOT INTUBATE.  Following day she did not show improvement post mechanical thrombectomy.  Further discussion was had with neuro intervention, and family members after which the patient was made comfort measures only.  She was notably Covid-19+ via Covid swab on 4/24/2021.  Hospice was consulted and she is was accepted into their service.  Patient was transferred to hospice on 4/25/2021.    Vitals:  /71 (BP Location: Left arm, Patient Position: Lying)   Pulse (!) 121   Temp 98.2 °F (36.8 °C) (Oral)   Resp 22   Wt 71.1 kg (156 lb 12 oz)   SpO2 92%   BMI 29.62 kg/m²     Advance Directives: Code Status and Medical Interventions:   Ordered at: 04/25/21 1224     Level Of Support Discussed With:    Next of Kin (If No Surrogate)     Code Status:    No CPR     Medical Interventions (Level of Support Prior to Arrest):    Comfort Measures        Physical Exam:  General Appearance:   Ill-appearing elderly woman supine in bed   Head:   Atraumatic   Eyes:              Ears:      Throat:  Mucosa dry   Neck:  Trachea midline   Back:       Lungs:    Equal breath sounds, clear    Heart:   Irregular rhythm, increased rate, S1, S2 auscultated   Abdomen:    Bowel sounds present, soft   Rectal:   Deferred   Extremities:  No pretibial edema   Pulses:     Skin:  Warm and dry   Lymph nodes:     Neurologic:  Somnolent, flaccid left side, arouses with stimuli, gaze forced to the right       Labs:            Invalid input(s): LABALBU, PROT      No results found for: MG, PHOS              Discharge Medications:     Discharge Medications      ASK your doctor about these medications      Instructions Start Date   anastrozole 1 MG tablet  Commonly known as: ARIMIDEX   1 mg, Oral, Daily      bisoprolol 5 MG tablet  Commonly known as: ZEBeta   2.5 mg, Oral, Daily      Calcium 600+D High Potency 600-400 MG-UNIT per  tablet  Generic drug: calcium carbonate-vitamin d   1 tablet, Oral, Daily      candesartan 32 MG tablet  Commonly known as: ATACAND   TAKE 1/2 TABLET BY MOUTH DAILY. NEED APPOINTMENT FOR REFILLS      chlorthalidone 25 MG tablet  Commonly known as: HYGROTON   25 mg, Oral, Daily      ferrous sulfate 325 (65 FE) MG tablet   325 mg, Oral, Daily With Breakfast      gabapentin 300 MG capsule  Commonly known as: NEURONTIN   300 mg, Oral, 3 Times Daily      HYDROcodone-acetaminophen 5-325 MG per tablet  Commonly known as: NORCO   1 tablet, Oral, Every 6 Hours PRN, for pain      mupirocin 2 % ointment  Commonly known as: BACTROBAN   APPLY SMALL AMOUNT TOPICALLY TO THE AFFECTED AREA TWICE DAILY      nitroglycerin 0.4 MG SL tablet  Commonly known as: NITROSTAT   No dose, route, or frequency recorded.      omeprazole 20 MG capsule  Commonly known as: priLOSEC   20 mg, Oral, Daily      ondansetron ODT 4 MG disintegrating tablet  Commonly known as: ZOFRAN-ODT   4 mg, Every 6 Hours PRN, DISSOLVE ON TONGUE      potassium chloride 10 MEQ CR capsule  Commonly known as: MICRO-K   20 mEq, Oral, Daily      pravastatin 40 MG tablet  Commonly known as: PRAVACHOL   40 mg, Oral, Every Night at Bedtime      Vitamin D3 50 MCG (2000 UT) capsule   2,000 Units, Oral, Daily      warfarin 4 MG tablet  Commonly known as: COUMADIN   No dose, route, or frequency recorded.             Discharge Diet:   N.p.o.    Activity at Discharge:   Bedrest    Follow-up Appointments  No future appointments.      Discharge Instructions:  To hospice       ANASTASIYA Zayas, [facility]  Pulmonary & Critical Care Medicine  Electronically signed by ANASTASIYA Zayas, 04/25/21, 3:13 PM EDT.      Time: I spent 15 minutes on this discharge activity which included: face-to-face encounter with the patient, reviewing the data in the system, coordination of the care with the nursing staff as well as consultants, documentation, and entering orders.       CC:  Clifford Lira MD         [unfilled]

## 2021-04-25 NOTE — NURSING NOTE
Stroke Navigator CODE STROKE    TIME NOTIFIED OF PATIENTS ARRIVAL 2240, TIME OF PATIENT EVALUATION 2240    HPI    Bhavna Spencer is a 89 y.o.  female on whom I am evaluating as a Code Stroke.  She has a known diagnosis of atrial fibrillation, hypertension, legally blind, TIAs, and breast cancer, lung cancer with mets to the brain.  We had extensive conversation with both the emergency room physician at [facility], as well as the patient's daughter (Tucker Cervantes), regarding her poor prognosis despite a mechanical thrombectomy.  She has been transferred to [facility] for mechanical thrombectomy on a compassionate care basis.    I met the patient in the emergency department and transported her straight to the Cath Lab.    The patient was subsequently hospitalized and had a large hematoma due to bleeding while on anticoagulation.  The patient also had a hematoma in her right axilla from her biopsy.  Her Coumadin and Lovenox was stopped approximately 2 weeks ago.      Code Stroke location: Transfer    · Last known well: 5 pm    · GCS: 15  · Baseline level of function known: yes. Modified Kendrick: 1   · Current symptoms include; extremity weakness, extremity numbness, facial droop, hemiplegic, dysarthria, visual field cut and neglect, affecting primarily the left face, upper extremity and lower extremity. Symptoms are currently unchanged.   · Patient is not a candidate for thrombolytic therapy due to LKW >4.5 hours  · Patient is a candidate for Neuro Intervention due to a right MCA occlusion showing on the CT perfusion scan.  She will be transported directly to the Cath Lab suite.    Stroke risk factors: atrial fibrillation, hypertension, cancer, history of smoking and TIA's.     Prior stroke history: No      · Imaging performed: Outside hospital imaging      NIHSS    Interval: Baseline  1a. Level of Consciousness: 0-->Alert, keenly responsive  1b. LOC Questions: 0-->Answers both questions correctly  1c. LOC  Commands: 0-->Performs both tasks correctly  2. Best Gaze: 2-->Forced deviation, or total gaze paresis not overcome by the oculocephalic maneuver  3. Visual: 1-->Partial hemianopia  4. Facial Palsy: 2-->Partial paralysis (total or near-total paralysis of lower face)  5a. Motor Arm, Left: 4-->No movement  5b. Motor Arm, Right: 0-->No drift, limb holds 90 (or 45) degrees for full 10 secs  6a. Motor Leg, Left: 4-->No movement  6b. Motor Leg, Right: 0-->No drift, leg holds 30 degree position for full 5 secs  7. Limb Ataxia: 0-->Absent  8. Sensory: 2-->Severe to total sensory loss, patient is not aware of being touched in the face, arm, and leg  9. Best Language: 0-->No aphasia, normal  10. Dysarthria: 2-->Severe dysarthria, patients speech is so slurred as to be unintelligible in the absence of or out of proportion to any dysphasia, or is mute/anarthric  11. Extinction and Inattention (formerly Neglect): 2-->Profound brionna-inattention/extinction more than 1 modality    Total (NIH Stroke Scale): 19       PLAN    I have discussed the imaging and plan with Dr. Almaraz and the patient will be transported immediately to the Cath Lab for a mechanical thrombectomy upon arrival.    Initiate ischemic stroke order set without thrombolytic therapy  Initiate post radiology order set  Systolic blood pressure less than 140     Addendum :     The patient is Covid 19 positive  The family wishes to make the patient comfort measures only and has inquired about taking the patient home with hospice.     We will follow with Neurology for further management of possible CVA.    Agustina Motta, THOMAS EXTENDER/STROKE NAVIGATOR

## 2021-04-25 NOTE — H&P (VIEW-ONLY)
Intensive Care Admission Note     Acute ischemic right MCA stroke (CMS/HCC)    History of Present Illness     This an 89-year-old patient who was transferred to our facility after having first been evaluated at [facility] for abrupt onset of left hemiplegia.  At that facility she underwent cerebral imaging which showed a large right MCA occlusion with perfusion defect.  She has a history of recently diagnosed left breast adenocarcinoma as well as a second primary of right hilar lung non-small cell carcinoma.  It has been discovered that she has at least 3 intracerebral metastases from one or both of these cancers.  She has been on chronic warfarin secondary to atrial fibrillation however this was stopped at the end of March secondary to some bleeding after her biopsies for her breast cancer.    She was transferred to our facility for possible intervention in the Cath Lab with thrombectomy.  She was taken to the Cath Lab and underwent angiography with salvage thrombectomy of the right internal carotid to the MCA with return of JEANNE-3 flow.    Problem List, Surgical History, Family, Social History, and ROS     Patient Active Problem List    Diagnosis    • *Acute ischemic right MCA stroke (CMS/HCC) [I63.511]    • Brain metastases (CMS/HCC) [C79.31]    • Lung cancer (CMS/HCC) [C34.90]    • Breast cancer (CMS/HCC) [C50.919]    • Atrial fibrillation (CMS/HCC) [I48.91]      Past Surgical History:   Procedure Laterality Date   • BREAST BIOPSY     • CAROTID ENDARTERECTOMY     • COLONOSCOPY  2018   • SANJU         Allergies   Allergen Reactions   • Ibandronic Acid Unknown - Low Severity   • Meclizine Unknown - Low Severity     No current facility-administered medications on file prior to encounter.     Current Outpatient Medications on File Prior to Encounter   Medication Sig   • anastrozole (ARIMIDEX) 1 MG tablet Take 1 mg by mouth Daily.   • bisoprolol (ZEBeta) 5 MG tablet Take 2.5 mg by mouth Daily.   • Calcium  600+D High Potency 600-400 MG-UNIT per tablet Take 1 tablet by mouth Daily.   • candesartan (ATACAND) 32 MG tablet TAKE 1/2 TABLET BY MOUTH DAILY. NEED APPOINTMENT FOR REFILLS   • chlorthalidone (HYGROTON) 25 MG tablet Take 25 mg by mouth Daily.   • Cholecalciferol (Vitamin D3) 50 MCG (2000 UT) capsule Take 2,000 Units by mouth Daily.   • ferrous sulfate 325 (65 FE) MG tablet Take 325 mg by mouth Daily With Breakfast.   • gabapentin (NEURONTIN) 300 MG capsule Take 300 mg by mouth 3 (Three) Times a Day.   • HYDROcodone-acetaminophen (NORCO) 5-325 MG per tablet Take 1 tablet by mouth Every 6 (Six) Hours As Needed. for pain   • mupirocin (BACTROBAN) 2 % ointment APPLY SMALL AMOUNT TOPICALLY TO THE AFFECTED AREA TWICE DAILY   • nitroglycerin (NITROSTAT) 0.4 MG SL tablet    • omeprazole (priLOSEC) 20 MG capsule Take 20 mg by mouth Daily.   • ondansetron ODT (ZOFRAN-ODT) 4 MG disintegrating tablet 4 mg Every 6 (Six) Hours As Needed. DISSOLVE ON TONGUE   • potassium chloride (MICRO-K) 10 MEQ CR capsule Take 20 mEq by mouth Daily.   • pravastatin (PRAVACHOL) 40 MG tablet Take 40 mg by mouth every night at bedtime.   • warfarin (COUMADIN) 4 MG tablet      MEDICATION LIST AND ALLERGIES REVIEWED.    No family history on file.  Social History     Tobacco Use   • Smoking status: Former Smoker     Quit date:      Years since quittin.3   • Smokeless tobacco: Never Used   Substance Use Topics   • Alcohol use: Never   • Drug use: Never         Review of Systems   Unable to perform ROS: Mental status change       Physical Exam and Clinical Information   BP (!) 201/129   Pulse (!) 130   Resp 18   SpO2 97%   Physical Exam  Constitutional:       Comments: Very ill-appearing woman.   HENT:      Head: Normocephalic.      Nose: No congestion.   Eyes:      Extraocular Movements: Extraocular movements intact.      Pupils: Pupils are equal, round, and reactive to light.   Cardiovascular:      Rate and Rhythm: Tachycardia present.  Rhythm irregular.      Pulses: Normal pulses.   Pulmonary:      Effort: Pulmonary effort is normal.      Breath sounds: Normal breath sounds.   Abdominal:      General: Abdomen is flat. Bowel sounds are normal.   Musculoskeletal:      Cervical back: Neck supple. No rigidity.      Comments: Not moving left upper extremity.   Lymphadenopathy:      Cervical: No cervical adenopathy.   Skin:     General: Skin is dry.      Coloration: Skin is not jaundiced.      Findings: No bruising.   Neurological:      Comments: Somnolent.                Impression     Medical Problems     Hospital Problem List     * (Principal) Acute ischemic right MCA stroke (CMS/HCC)    Brain metastases (CMS/HCC)    Lung cancer (CMS/HCC)    Breast cancer (CMS/HCC)    Atrial fibrillation (CMS/HCC)              Plan/Recommendations     This patient has an extremely poor prognosis giving the large stroke she has experienced which is most certainly cardioembolic, with history of 2 primary cancers with signs of metastasis to the brain.    She will be admitted to the intensive care unit for postoperative care.    Discussions will need to be had regarding goals of care with her family if she is not able to make these determinations herself.  At this time, I feel it would be inhumane to subject her to resuscitation with CPR or defibrillation in the event of cardiac arrest and life support with mechanical ventilation likely would only prolong her suffering.  I feel that it would be most appropriate to make her a DO NOT RESUSCITATE with full support however as before we will discuss this with her family.    Follow-up labs will be ordered.  Postoperative neurochecks will be undertaken.  I would like for palliative care and hospice to be consulted tomorrow to help work out some goals of care as well as to potentially establish a plan if she is able to leave the hospital.    This patient remains at very high risk of worsening for all the above reasons.    High  level of risk due to:  severe exacerbation of chronic illness, illness with threat to life or bodily function, abrupt change in neurological status and decision for DNR or to de-escalate care.        Jose Forrest MD, [facility]  Pulmonary and Critical Care Medicine  04/24/21 23:22 EDT     CC: Clifford Lira MD

## 2021-04-25 NOTE — PROGRESS NOTES
"Patient is an 89-year-old female who presented to [facility] emergency department with symptoms of an LVO stroke.  Prior to the last 6 months or so, she was high functioning and independent.  She has been recently diagnosed with metastatic breast cancer.  She presented to [facility] emergency department approximately 4.5 hours since last known well, and had CTA/CT perfusion scan demonstrating right MCA occlusion.  While her ASPECTS was reasonable at a 7/8, she has a very poor hypoperfusion index of 0.8 on her CT perfusion, suggesting a rapidly progressive stroke.    We had extensive discussions with the emergency department at [facility], as well as with the patient's family, regarding her poor prognosis despite all aggressive measures/interventions.  Despite this, the family is fairly adamant that they wish to attempt a mechanical thrombectomy, even though I feel this is likely a \"futile\" thrombectomy.  She is being transferred to [facility] for mechanical thrombectomy on a compassionate care basis.        "

## 2021-04-25 NOTE — SIGNIFICANT NOTE
04/25/21 1251   SLP Deferred Reason   SLP Deferred Reason Unable to evaluate, medical status change  (Consult received per stroke path. Pt is hospice/comfort measures only. Please reconsult if any changes/needs arise.)

## 2021-04-25 NOTE — PROGRESS NOTES
Nutrition Services    Patient Name:  Bhavna Spencer  YOB: 1932  MRN: [id number]  Admit Date:  4/24/2021    Patient triggered for nutrition assessment with admission nutrition risk for difficulty chewing and swallowing. EMR reviewed and events noted.  Per MD note, family has decided to peruse comfort measures.  No nutrition interventions warranted at this time.     Electronically signed by:  Fela Bravo RD  04/25/21 12:47 EDT

## 2021-04-25 NOTE — PROGRESS NOTES
"NAME: LYLA MACKEY  : 1932  PCP: Clifford Lira MD  ADMITTING PHYSICIAN: Jose Forrest MD    DATE OF ADMISSION:  2021  DATE OF SERVICE: 2021    HOSPITAL DAY:  1 day    HISTORY OF PRESENT ILLNESS:  89 y.o. female Patient is an 89-year-old female who presented to [facility] emergency department with symptoms of an LVO stroke.  Prior to the last 6 months or so, she was high functioning and independent.  She has been recently diagnosed with metastatic breast cancer.  She presented to [facility] emergency department approximately 4.5 hours since last known well, and had CTA/CT perfusion scan demonstrating right MCA occlusion.  While her ASPECTS was reasonable at a 7/8, she has a very poor hypoperfusion index of 0.8 on her CT perfusion, suggesting a rapidly progressive stroke.     We had extensive discussions with the emergency department at [facility], as well as with the patient's family, regarding her poor prognosis despite all aggressive measures/interventions.  Despite this, the family is fairly adamant that they wish to attempt a mechanical thrombectomy, even though I feel this is likely a \"futile\" thrombectomy.  She is being transferred to [facility] for mechanical thrombectomy on a compassionate care basis.    REVIEW OF IMAGING:  The patient has just failed to show significant improvement following thrombectomy, and thus has been deemed \"comfort measures only\", which I think is appropriate.  Subsequently, a follow-up CT scan has been canceled at this point.    LABS:  No results found for: WBC, HGB, HCT, MCV, PLT  No results found for: GLUCOSE, CALCIUM, NA, K, CO2, CL, BUN, CREATININE, EGFRIFAFRI, EGFRIFNONA, BCR, ANIONGAP  Lab Results   Component Value Date    HGBA1C 4.70 (L) 2021     Lab Results   Component Value Date    CHOL 93 2021    TRIG 54 2021    HDL 34 (L) 2021    LDL 46 2021       CURRENT MEDS:  Current Facility-Administered " "Medications   Medication Dose Route Frequency Provider Last Rate Last Admin   • glycopyrrolate (ROBINUL) injection 0.1 mg  0.1 mg Intravenous Once Cris Cruz APRN       • LORazepam (ATIVAN) injection 1 mg  1 mg Intravenous Q4H PRN Cris Cruz APRN       • morphine injection 2 mg  2 mg Intravenous Q2H PRN Cris Cruz, ANASTASIYA           PHYSICAL EXAM:  Vitals:    04/25/21 0900   BP:    Pulse: 100   Resp:    Temp:    SpO2: 93%      Unfortunately, Ms. Spencer is failed to show a dramatic improvement despite all aggressive measures we will start with a mechanical thrombectomy, again I suspect that this was a \"futile\" thrombectomy.  This morning's NIHSS is charted is a 21.  She remains flaccid on the left side with severe dysphagia.  She is baseline \"legally blind\".    ASSESSMENT/PLAN:  Ms. Spencer is a 89 y.o. female who was transferred from [facility] emergency department for mechanical thrombectomy for LVO and right MCA occlusion.  She was very high functioning prior to a recent diagnosis of metastatic breast cancer, and thus the family wish to pursue all aggressive therapy, to include mechanical thrombectomy.  We did have lengthy discussions with the family prior to the procedure regarding the probable \"futile\" nature of thrombectomy given her age, newly diagnosed metastatic breast cancer, and CT perfusion suggesting rapidly progressive stroke (Hypoperfusion index 0.8).    Unfortunately, Ms. Spencer has failed to show any real improvement with mechanical thrombectomy, and again I suspect this was a \"futile\" of thrombectomy.  At this point, the family has wished to pursue \"comfort measures only\", and I certainly think this is reasonable.                "

## 2021-04-26 NOTE — PLAN OF CARE
Goal Outcome Evaluation:        No signs of pain/discomfort. Hospice/comfort measures. On 2L NC. Godoy in place. Family at bedside. Patient appears to be resting comfortably. Scopolamine patch applied. Will continue with plan of care.

## 2021-04-26 NOTE — PROGRESS NOTES
"Continued Stay Note  [facility]     Patient Name: Bhavna Spencer  MRN: [id number]  Today's Date: 4/26/2021    Admit Date: 4/25/2021 4/26 Ms. Bhavna Spencer is a white , 89 yo female, PPS 10%.  Patient presented to [facility] ER 4/24, CT confirmed right sided cerebral CVA. Pt was also diagnosed with Covid 19.Pt assessment is given per primary RN due to pt being in COVID-19 isolation. “Patient is nonresponsive but appears comfortable this time. Daughter at bedside. Breathe sounds congested. Skin warm and dry with no mottling\". Godoy drained 700 cc urine, BM unknown. PRN Haldol x 1, toradol 15 mg x 1, Ativan 0.5 mg x 2, morphine x 1, Zofran x 1/ 24 hrs given for pain , fever, anxiety and nausea.  Currently patient remains GIP for complications of EOL care requiring skilled nursing and medical management of symptoms. Discharge plan dependent on a decreased level of care and survival of this admission        Sophie Elder RN    "

## 2021-04-26 NOTE — INTERVAL H&P NOTE
Pt admitted to [facility] on 4/25/2021. Please see Hospice documentation for further information.

## 2021-04-26 NOTE — PLAN OF CARE
Problem: Skin Injury Risk Increased  Goal: Skin Health and Integrity  Outcome: Ongoing, Progressing  Intervention: Optimize Skin Protection  Recent Flowsheet Documentation  Taken 4/26/2021 0200 by Gely Husain RN  Pressure Reduction Techniques:   frequent weight shift encouraged   weight shift assistance provided   pressure points protected  Head of Bed (HOB): HOB at 20-30 degrees  Pressure Reduction Devices:   pressure-redistributing mattress utilized   positioning supports utilized  Skin Protection:   adhesive use limited   tubing/devices free from skin contact   incontinence pads utilized  Taken 4/26/2021 0000 by Gely Husain RN  Pressure Reduction Techniques:   frequent weight shift encouraged   weight shift assistance provided   pressure points protected  Head of Bed (HOB): HOB at 20-30 degrees  Pressure Reduction Devices:   positioning supports utilized   pressure-redistributing mattress utilized  Skin Protection:   adhesive use limited   tubing/devices free from skin contact  Taken 4/25/2021 2117 by Gely Husain RN  Pressure Reduction Techniques:   pressure points protected   weight shift assistance provided   heels elevated off bed   frequent weight shift encouraged  Head of Bed (HOB): HOB at 30 degrees  Pressure Reduction Devices:   positioning supports utilized   pressure-redistributing mattress utilized  Skin Protection:   adhesive use limited   tubing/devices free from skin contact   incontinence pads utilized     Problem: Fall Injury Risk  Goal: Absence of Fall and Fall-Related Injury  Outcome: Ongoing, Progressing  Intervention: Identify and Manage Contributors to Fall Injury Risk  Recent Flowsheet Documentation  Taken 4/25/2021 2117 by Gely Husain RN  Medication Review/Management: medications reviewed  Intervention: Promote Injury-Free Environment  Recent Flowsheet Documentation  Taken 4/26/2021 0200 by Gely Husain RN  Safety Promotion/Fall Prevention:   activity  supervised   clutter free environment maintained   assistive device/personal items within reach   fall prevention program maintained   lighting adjusted   nonskid shoes/slippers when out of bed   room organization consistent   safety round/check completed  Taken 4/26/2021 0000 by Gely Husain, RN  Safety Promotion/Fall Prevention:   activity supervised   assistive device/personal items within reach   clutter free environment maintained   fall prevention program maintained   lighting adjusted   nonskid shoes/slippers when out of bed   safety round/check completed   room organization consistent  Taken 4/25/2021 2117 by Gely Husain, RN  Safety Promotion/Fall Prevention:   assistive device/personal items within reach   activity supervised   clutter free environment maintained   fall prevention program maintained   lighting adjusted   nonskid shoes/slippers when out of bed   room organization consistent   safety round/check completed   Goal Outcome Evaluation:

## 2021-04-26 NOTE — H&P
Hospice History and Physical     Patient Name:  Bhavna Spencer   : 1932   Sex: female    Patient Care Team:  Clifford Lira MD as PCP - General (Family Medicine)  Praveen Vo MD as Consulting Physician (Radiation Oncology)  Jose Maharaj MD as Referring Physician (Neurosurgery)  Libby Reich as Consulting Physician  Jose Rosen MD as Consulting Physician (Internal Medicine)  Libby Reich    Code Status: Comfort Measures    Subjective     Per Intensivist Discharge Summary:    History of Present Illness:  This an 89-year-old patient who was transferred to our facility after having first been evaluated at [facility] for abrupt onset of left hemiplegia.  At that facility she underwent cerebral imaging which showed a large right MCA occlusion with perfusion defect.  She has a history of recently diagnosed left breast adenocarcinoma as well as a second primary of right hilar lung non-small cell carcinoma.  It has been discovered that she has at least 3 intracerebral metastases from one or both of these cancers.  She has been on chronic warfarin secondary to atrial fibrillation however this was stopped at the end of March secondary to some bleeding after her biopsies for her breast cancer.     She was transferred to our facility for possible intervention in the Cath Lab with thrombectomy.  She was taken to the Cath Lab and underwent angiography with salvage thrombectomy of the right internal carotid to the MCA with return of JEANNE-3 flow.     Hospital Course:  Bhavna Spencer is a 89 y.o. female who presented to [facility] as discussed in HPI.  She was admitted to ICU post procedure.  Her overall prognosis was discussed with patient's family who wished to make her DO NOT RESUSCITATE/DO NOT INTUBATE.  Following day she did not show improvement post mechanical thrombectomy.  Further discussion was had with neuro intervention, and family members after which the patient  was made comfort measures only.  She was notably Covid-19+ via Covid swab on 4/24/2021.  Hospice was consulted and she is was accepted into their service.  Patient was transferred to hospice on 4/25/2021.    [end of copied text]    Ms. Spencer was admitted to in Hospice on 4/25/2021 for mgmt of acute symptoms 2/2 CVA.     Pt requiring multiple prns to maintain comfort. Dtr at bedside.    Prns:   Haldol 1mg x1  Toradol 15mg x1  Ativan 0.5mg x1  Ativan 1mg x1  Morphine 2mg x1  Zofran 4mg x1    Review of Systems  Review of Systems   Unable to perform ROS: Patient unresponsive       History  Past Medical History:   Diagnosis Date   • A-fib (CMS/HCC)    • Anemia    • Brain cancer (CMS/HCC)    • Breast cancer (CMS/HCC)    • Hypertension    • Lung cancer (CMS/HCC)    • TIA (transient ischemic attack)      Past Surgical History:   Procedure Laterality Date   • BREAST BIOPSY     • CAROTID ENDARTERECTOMY     • COLONOSCOPY  2018   • SANJU       Current Facility-Administered Medications   Medication Dose Route Frequency Provider Last Rate Last Admin   • acetaminophen (TYLENOL) suppository 650 mg  650 mg Rectal Q4H PRN Loiusa Monteiro H, APRN       • bisacodyl (DULCOLAX) suppository 10 mg  10 mg Rectal Daily PRN Louisa Monteiro H, APRN       • furosemide (LASIX) injection 20 mg  20 mg Intravenous Q6H PRN Louisa Monteiro H, APRN       • furosemide (LASIX) injection 20 mg  20 mg Intravenous Q6H Louisa Monteiro H, APRN   20 mg at 04/26/21 1157   • glycopyrrolate (ROBINUL) injection 0.2 mg  0.2 mg Intravenous Q6H PRN Louisa Monteiro H, APRN       • haloperidol lactate (HALDOL) injection 1 mg  1 mg Intravenous Q4H PRN Louisa Monteiro H, APRN   1 mg at 04/25/21 1559   • ketorolac (TORADOL) injection 15 mg  15 mg Intravenous Q6H PRN Louisa Monteiro H, APRN   15 mg at 04/25/21 1603   • LORazepam (ATIVAN) injection 0.5 mg  0.5 mg Intravenous Q4H PRN Louisa Monteiro H, APRN   0.5 mg at 04/25/21 1745   • LORazepam (ATIVAN) injection  1 mg  1 mg Intravenous Q6H Louisa Monteiro, APRN   1 mg at 21 0848   • Morphine sulfate (PF) injection 4 mg  4 mg Intravenous Q6H Louisa Monteiro, APRN   4 mg at 21 0848   • ondansetron (ZOFRAN) injection 4 mg  4 mg Intravenous Q6H PRN Louisa Monteiro H, APRN       • promethazine (PHENERGAN) IVPB 12.5 mg  12.5 mg Intravenous Q6H PRN Louisa Monteiro, APRN       • Scopolamine (TRANSDERM-SCOP) 1.5 MG/3DAYS patch 1 patch  1 patch Transdermal Q72H PRN Louisa Monteiro, APRN            •  acetaminophen  •  bisacodyl  •  furosemide  •  glycopyrrolate  •  haloperidol lactate  •  ketorolac  •  LORazepam  •  ondansetron  •  promethazine  •  Scopolamine  Allergies   Allergen Reactions   • Ibandronic Acid Unknown - Low Severity   • Meclizine Unknown - Low Severity     No family history on file.  Social History     Socioeconomic History   • Marital status:      Spouse name: Not on file   • Number of children: Not on file   • Years of education: Not on file   • Highest education level: Not on file   Tobacco Use   • Smoking status: Former Smoker     Quit date:      Years since quittin.3   • Smokeless tobacco: Never Used   Substance and Sexual Activity   • Alcohol use: Never   • Drug use: Never       Objective     Vital Signs  Temp:  [99.1 °F (37.3 °C)-100.7 °F (38.2 °C)] 99.1 °F (37.3 °C)  Heart Rate:  [121-152] 135  Resp:  [16-28] 20  BP: ()/(51-71) 90/51      PPS: Palliative Performance Scale score as of 2021, 14:38 EDT is 10% based on the following measures:   Ambulation: Totally bed bound  Activity and Evidence of Disease: Unable to do any work, extensive evidence of disease  Self-Care: Total care  Intake:  Mouth care only  LOC: Drowsy or coma      Physical Exam:  Physical Exam  Constitutional:       General: She is not in acute distress.     Appearance: She is ill-appearing.      Comments: Pt appears comfortable; audible congestion.   HENT:      Head: Normocephalic.       Mouth/Throat:      Mouth: Mucous membranes are dry.   Eyes:      Comments: closed   Cardiovascular:      Comments: Difficult to assess HT 2/2 respiratory acoustics  irr irr   + radial pulses  Pulmonary:      Comments: Audible congestion; coarse rhonchi inspir/expir  Abdominal:      Comments: Rounded; NT/ND; BSx4   Musculoskeletal:      Cervical back: Neck supple.   Skin:     General: Skin is dry.      Coloration: Skin is pale.   Neurological:      Comments: Does not follow commands   Psychiatric:      Comments: No facial grimacing; no moaning       Results Review:   Lab Results   Component Value Date    HGBA1C 4.70 (L) 04/25/2021         CVA (cerebral vascular accident) (CMS/[facility])      Assessment/Plan   Assessment/Plan:     89yoF admitted in Hospice 4/25 for CVA.    Unconsciousness  Congestion  Dyspnea  Anxiety  Pain, nos  EOLC    Lasix 20mg q6h    Ativan 1mg q6h    Morphine 4mg q6h - lower doses not effective    Scop patch     Prns reviewed/adjusted for comfort    Maintain airborne precautions (Covid+)    Visitation per [facility] policy    Discussion with dtr, Candace, at bedside regarding end of life s/s and symptom mgmt.     Total Visit Time: 70min  Face to Face Time: 35min    Justification for care:  Patient meets criteria for acute in-patient care with required nursing assessment and interventions for symptoms with IV medications.      Louisa Monteiro, ULYSSES, MHA, APRN  [facility] Care Navigators  Hospice and Palliative Care Nurse Practitioner  04/26/21  12:40 EDT

## 2021-04-27 NOTE — SIGNIFICANT NOTE
Exam confirms with auscultation zero audible heart tones and zero audible respirations. Ms.Pauline KAPIL Spencer was pronounced dead at 1840.  MD notified by Patient's RN.    Bettina Brambila RN  Clinical House Supervisor  4/26/2021 21:03 EDT

## 2021-04-27 NOTE — DISCHARGE SUMMARY
HOSPICE DISCHARGE SUMMARY        DATE OF ADMISSION - 4/25/2021  DATE OF DEATH - 4/26/2021  TIME OF DEATH - 1840     BRIEF HOSPITAL COURSE:   89yo F hospitalized 4/24/2021 with left hemiplegia, found to have acute ischemic right MCA stroke.  Medical hx significant for AF (off warfarin since the end of March) due to bleeding s/p breast bx, as well as recent diagnoses of both left breast adenocarcinoma and right hilar lung nonsmall cell carcinoma, with brain metastases.   Despite intervention with angiography and salvage thrombectomy of the right internal carotid to the MCA with return of JEANNE 3 (normal) blood flow, patient failed to have any improvement and family elected transition to full comfort focused care.      Patient was admitted to inpatient Hospice care on 4/25/21. Medications were provided to manage any distressing symptoms of pain, dyspnea, anxiety, restlessness, agitation, fever, constipation and terminal secretions.  Support and education for family was available from the multidisciplinary Hospice team.     Patient declined quickly as expected and death was pronounced at  1840 on 4/26/21.                  Pat Bear MD           Hospice Physician, [facility]

## 2021-07-12 ENCOUNTER — CALL CENTER PROGRAMS (OUTPATIENT)
Dept: CALL CENTER | Facility: HOSPITAL | Age: 86
End: 2021-07-12

## 2021-07-12 NOTE — OUTREACH NOTE
Stroke [facility] Survey      Responses   Facility patient discharged from?  Early   Attempt successful  No   Revoke     Patient  score     Call Center [facility] Score  6          Gail Das RN

## (undated) DEVICE — PROVUE RETRIEVER: Brand: TREVO NXT

## (undated) DEVICE — LIMB HOLDER, WRIST/ANKLE: Brand: DEROYAL

## (undated) DEVICE — BALLOON GUIDE CATHETER: Brand: FLOWGATE2

## (undated) DEVICE — ROTATING HEMOSTATIC VALVE .096": Brand: RHV

## (undated) DEVICE — LEX NEURO ANGIOGRAPHY: Brand: MEDLINE INDUSTRIES, INC.

## (undated) DEVICE — ST ACC MICROPUNCTURE .018 TRANSLSS/SS/TP 5F/10CM 21G/7CM

## (undated) DEVICE — INTRO SHEATH ENGAGE W/50 GW .038 8F12

## (undated) DEVICE — RADIFOCUS GLIDEWIRE: Brand: GLIDEWIRE

## (undated) DEVICE — MICROCATHETER: Brand: TREVO TRAK 21

## (undated) DEVICE — STPCK 3/WY HP M/RA W/OFF/HNDL 1050PSI STRL

## (undated) DEVICE — GUIDEWIRE WITH ICE™ HYDROPHILIC COATING: Brand: TRANSEND™ EX

## (undated) DEVICE — ANGIO-SEAL VIP VASCULAR CLOSURE DEVICE: Brand: ANGIO-SEAL